# Patient Record
Sex: MALE | Race: OTHER | HISPANIC OR LATINO | URBAN - METROPOLITAN AREA
[De-identification: names, ages, dates, MRNs, and addresses within clinical notes are randomized per-mention and may not be internally consistent; named-entity substitution may affect disease eponyms.]

---

## 2021-12-13 ENCOUNTER — INPATIENT (INPATIENT)
Facility: HOSPITAL | Age: 61
LOS: 0 days | Discharge: HOME | DRG: 177 | End: 2021-12-14
Attending: INTERNAL MEDICINE | Admitting: INTERNAL MEDICINE
Payer: MEDICAID

## 2021-12-13 VITALS
DIASTOLIC BLOOD PRESSURE: 70 MMHG | WEIGHT: 176.15 LBS | RESPIRATION RATE: 20 BRPM | HEART RATE: 66 BPM | OXYGEN SATURATION: 95 % | TEMPERATURE: 98 F | HEIGHT: 70 IN | SYSTOLIC BLOOD PRESSURE: 105 MMHG

## 2021-12-13 DIAGNOSIS — Z29.9 ENCOUNTER FOR PROPHYLACTIC MEASURES, UNSPECIFIED: ICD-10-CM

## 2021-12-13 DIAGNOSIS — J96.01 ACUTE RESPIRATORY FAILURE WITH HYPOXIA: ICD-10-CM

## 2021-12-13 DIAGNOSIS — U07.1 COVID-19: ICD-10-CM

## 2021-12-13 LAB
ALBUMIN SERPL ELPH-MCNC: 2.8 G/DL — LOW (ref 3.5–5)
ALP SERPL-CCNC: 64 U/L — SIGNIFICANT CHANGE UP (ref 40–120)
ALT FLD-CCNC: 22 U/L DA — SIGNIFICANT CHANGE UP (ref 10–60)
ANION GAP SERPL CALC-SCNC: 4 MMOL/L — LOW (ref 5–17)
AST SERPL-CCNC: 26 U/L — SIGNIFICANT CHANGE UP (ref 10–40)
BASOPHILS # BLD AUTO: 0.01 K/UL — SIGNIFICANT CHANGE UP (ref 0–0.2)
BASOPHILS NFR BLD AUTO: 0.2 % — SIGNIFICANT CHANGE UP (ref 0–2)
BILIRUB SERPL-MCNC: 0.6 MG/DL — SIGNIFICANT CHANGE UP (ref 0.2–1.2)
BUN SERPL-MCNC: 18 MG/DL — SIGNIFICANT CHANGE UP (ref 7–18)
CALCIUM SERPL-MCNC: 8.3 MG/DL — LOW (ref 8.4–10.5)
CHLORIDE SERPL-SCNC: 101 MMOL/L — SIGNIFICANT CHANGE UP (ref 96–108)
CO2 SERPL-SCNC: 31 MMOL/L — SIGNIFICANT CHANGE UP (ref 22–31)
CREAT SERPL-MCNC: 1.05 MG/DL — SIGNIFICANT CHANGE UP (ref 0.5–1.3)
D DIMER BLD IA.RAPID-MCNC: 272 NG/ML DDU — HIGH
EOSINOPHIL # BLD AUTO: 0.01 K/UL — SIGNIFICANT CHANGE UP (ref 0–0.5)
EOSINOPHIL NFR BLD AUTO: 0.2 % — SIGNIFICANT CHANGE UP (ref 0–6)
GLUCOSE SERPL-MCNC: 97 MG/DL — SIGNIFICANT CHANGE UP (ref 70–99)
HCT VFR BLD CALC: 41.8 % — SIGNIFICANT CHANGE UP (ref 39–50)
HGB BLD-MCNC: 14.9 G/DL — SIGNIFICANT CHANGE UP (ref 13–17)
IMM GRANULOCYTES NFR BLD AUTO: 0.2 % — SIGNIFICANT CHANGE UP (ref 0–1.5)
LYMPHOCYTES # BLD AUTO: 1.01 K/UL — SIGNIFICANT CHANGE UP (ref 1–3.3)
LYMPHOCYTES # BLD AUTO: 21.7 % — SIGNIFICANT CHANGE UP (ref 13–44)
MCHC RBC-ENTMCNC: 33.9 PG — SIGNIFICANT CHANGE UP (ref 27–34)
MCHC RBC-ENTMCNC: 35.6 GM/DL — SIGNIFICANT CHANGE UP (ref 32–36)
MCV RBC AUTO: 95 FL — SIGNIFICANT CHANGE UP (ref 80–100)
MONOCYTES # BLD AUTO: 0.64 K/UL — SIGNIFICANT CHANGE UP (ref 0–0.9)
MONOCYTES NFR BLD AUTO: 13.8 % — SIGNIFICANT CHANGE UP (ref 2–14)
NEUTROPHILS # BLD AUTO: 2.97 K/UL — SIGNIFICANT CHANGE UP (ref 1.8–7.4)
NEUTROPHILS NFR BLD AUTO: 63.9 % — SIGNIFICANT CHANGE UP (ref 43–77)
NRBC # BLD: 0 /100 WBCS — SIGNIFICANT CHANGE UP (ref 0–0)
PLATELET # BLD AUTO: 207 K/UL — SIGNIFICANT CHANGE UP (ref 150–400)
POTASSIUM SERPL-MCNC: 3.9 MMOL/L — SIGNIFICANT CHANGE UP (ref 3.5–5.3)
POTASSIUM SERPL-SCNC: 3.9 MMOL/L — SIGNIFICANT CHANGE UP (ref 3.5–5.3)
PROT SERPL-MCNC: 6.6 G/DL — SIGNIFICANT CHANGE UP (ref 6–8.3)
RBC # BLD: 4.4 M/UL — SIGNIFICANT CHANGE UP (ref 4.2–5.8)
RBC # FLD: 11.3 % — SIGNIFICANT CHANGE UP (ref 10.3–14.5)
SARS-COV-2 RNA SPEC QL NAA+PROBE: DETECTED
SODIUM SERPL-SCNC: 136 MMOL/L — SIGNIFICANT CHANGE UP (ref 135–145)
TROPONIN I, HIGH SENSITIVITY RESULT: 10.1 NG/L — SIGNIFICANT CHANGE UP
WBC # BLD: 4.65 K/UL — SIGNIFICANT CHANGE UP (ref 3.8–10.5)
WBC # FLD AUTO: 4.65 K/UL — SIGNIFICANT CHANGE UP (ref 3.8–10.5)

## 2021-12-13 PROCEDURE — 93010 ELECTROCARDIOGRAM REPORT: CPT

## 2021-12-13 PROCEDURE — 71045 X-RAY EXAM CHEST 1 VIEW: CPT | Mod: 26

## 2021-12-13 PROCEDURE — 99285 EMERGENCY DEPT VISIT HI MDM: CPT

## 2021-12-13 RX ORDER — REMDESIVIR 5 MG/ML
200 INJECTION INTRAVENOUS EVERY 24 HOURS
Refills: 0 | Status: COMPLETED | OUTPATIENT
Start: 2021-12-13 | End: 2021-12-13

## 2021-12-13 RX ORDER — DEXAMETHASONE 0.5 MG/5ML
6 ELIXIR ORAL DAILY
Refills: 0 | Status: DISCONTINUED | OUTPATIENT
Start: 2021-12-13 | End: 2021-12-14

## 2021-12-13 RX ORDER — ENOXAPARIN SODIUM 100 MG/ML
40 INJECTION SUBCUTANEOUS DAILY
Refills: 0 | Status: DISCONTINUED | OUTPATIENT
Start: 2021-12-13 | End: 2021-12-14

## 2021-12-13 RX ORDER — SODIUM CHLORIDE 9 MG/ML
1000 INJECTION INTRAMUSCULAR; INTRAVENOUS; SUBCUTANEOUS ONCE
Refills: 0 | Status: COMPLETED | OUTPATIENT
Start: 2021-12-13 | End: 2021-12-13

## 2021-12-13 RX ORDER — REMDESIVIR 5 MG/ML
100 INJECTION INTRAVENOUS EVERY 24 HOURS
Refills: 0 | Status: DISCONTINUED | OUTPATIENT
Start: 2021-12-14 | End: 2021-12-14

## 2021-12-13 RX ORDER — REMDESIVIR 5 MG/ML
INJECTION INTRAVENOUS
Refills: 0 | Status: DISCONTINUED | OUTPATIENT
Start: 2021-12-13 | End: 2021-12-14

## 2021-12-13 RX ORDER — SODIUM CHLORIDE 9 MG/ML
1000 INJECTION INTRAMUSCULAR; INTRAVENOUS; SUBCUTANEOUS
Refills: 0 | Status: DISCONTINUED | OUTPATIENT
Start: 2021-12-13 | End: 2021-12-14

## 2021-12-13 RX ADMIN — REMDESIVIR 500 MILLIGRAM(S): 5 INJECTION INTRAVENOUS at 23:48

## 2021-12-13 RX ADMIN — SODIUM CHLORIDE 1000 MILLILITER(S): 9 INJECTION INTRAMUSCULAR; INTRAVENOUS; SUBCUTANEOUS at 14:29

## 2021-12-13 RX ADMIN — SODIUM CHLORIDE 100 MILLILITER(S): 9 INJECTION INTRAMUSCULAR; INTRAVENOUS; SUBCUTANEOUS at 23:48

## 2021-12-13 NOTE — H&P ADULT - NSHPPHYSICALEXAM_GEN_ALL_CORE
PHYSICAL EXAM:  GENERAL: NAD, well-developed  HEAD:  Atraumatic, Normocephalic  EYES: EOMI, PERRLA, conjunctiva and sclera clear  NECK: Supple, No JVD  CHEST/LUNG: Clear to auscultation bilaterally; No wheeze  HEART: Regular rate and rhythm; S1+ S2+  ABDOMEN: Soft, Nontender, Nondistended; Bowel sounds present  EXTREMITIES:, No clubbing, cyanosis, or edema  NEUROLOGY: AAOx3 non-focal  SKIN: No rashes or lesions

## 2021-12-13 NOTE — H&P ADULT - PROBLEM SELECTOR PLAN 1
Pt p/w oxygen saturation 82% on telehealth visit Pt p/w oxygen saturation 82% on telehealth visit, 90% on room air on admission  S/p NS bolus on admission  -Started on IV Decadron x 10 days, Remdesivir x 5 days  Monitor inflammatory markers  D-dimers 272, c/w Lovenox for DVT ppx

## 2021-12-13 NOTE — H&P ADULT - ASSESSMENT
60 y/o Male, lives alone, from home, with no medical or surgical hx, presenting to the ED s/p telehealth visit earlier this morning, found to be hypoxic to 82% on room air, admitted for Acute hypoxic respiratory failure 2/2 COVID

## 2021-12-13 NOTE — ED PROVIDER NOTE - CARE PLAN
Principal Discharge DX:	2019 novel coronavirus disease (COVID-19)  Secondary Diagnosis:	Syncope  Secondary Diagnosis:	Hypoxia   1

## 2021-12-13 NOTE — PATIENT PROFILE ADULT - FALL HARM RISK - UNIVERSAL INTERVENTIONS
Bed in lowest position, wheels locked, appropriate side rails in place/Call bell, personal items and telephone in reach/Instruct patient to call for assistance before getting out of bed or chair/Non-slip footwear when patient is out of bed/[place] to call system/Physically safe environment - no spills, clutter or unnecessary equipment/Purposeful Proactive Rounding/Room/bathroom lighting operational, light cord in reach

## 2021-12-13 NOTE — H&P ADULT - PROBLEM SELECTOR PLAN 2
Pt tested COVID positive on 12/5, has been experiencing Pt tested COVID positive on 12/5, has been experiencing symptoms of low appetite, cough, low grade fevers  Syncopal episode (remembers the event, he did not hit his head, briefly lost consciousness while reaching out for a honey jar as he felt very weak, has not eaten very much in over a week)  Encourage oral hydration and diet  Monitor inflammatory markers Pt tested COVID positive on 12/5, has been experiencing symptoms of low appetite, cough, low grade fevers  Oxygen supplementation PRN  Monitor inflammatory markers

## 2021-12-13 NOTE — ED ADULT NURSE NOTE - OBJECTIVE STATEMENT
Pt AOx4, ambulatory, DX COV positive 12/05, sent by PCP for low O2 sat and syncopal episode 2 days ago. Pt denies pain, SOB, CP at present time. EKG done, pt placed on cardiac monitor, no signs of distress noted.

## 2021-12-13 NOTE — H&P ADULT - HISTORY OF PRESENT ILLNESS
Patient is a 60 y/o Male, lives alone, from home, with no medical or surgical hx, presenting to the ED s/p telehealth visit  Patient is a 60 y/o Male, lives alone, from home, with no medical or surgical hx, presenting to the ED s/p telehealth visit earlier this morning, found to be hypoxic to 82% on room air. Pt tested COVID positive 12/5, has been having cough, low grade fevers since Friday. No active chest pain, shortness of breath, leg pain or swelling, abdominal pain, nausea, vomiting, diarrhea. C/o myalgia and decreased appetite. Endorsed an episode of syncope on 12/11, he has not eaten very much at all, was reaching out to open a honey jar and had an episode of LOC. No head trauma, fall.     In the ED, vitals were  Temp 97.8, HR 66/min, /70, RR 20 with oxygen saturation of 90% on room air

## 2021-12-13 NOTE — H&P ADULT - PROBLEM SELECTOR PLAN 3
Lovenox for DVT ppx Pt tested COVID positive on 12/5, has been experiencing symptoms of low appetite, cough, low grade fevers  -Syncopal episode (remembers the event, he did not hit his head, briefly lost consciousness while reaching out for a honey jar as he felt very weak, has not eaten very much in over a week)  -EKG showed Sinus bradycardia, orthostatics negative  -Trop x 1 negative  Cardio consult*** Pt tested COVID positive on 12/5, has been experiencing symptoms of low appetite, cough, low grade fevers  -Syncopal episode (remembers the event, he did not hit his head, briefly lost consciousness while reaching out for a honey jar as he felt very weak, has not eaten very much in over a week)  -EKG showed Sinus bradycardia, orthostatics negative  -Trop x 2 negative  Cardio consulted Dr Restrepo

## 2021-12-13 NOTE — H&P ADULT - ATTENDING COMMENTS
Patient is a 62 y/o Male, lives alone, from home, with no medical or surgical hx, presenting to the ED s/p telehealth visit earlier this morning, found to be hypoxic to 82% on room air. Pt tested COVID positive 12/5, has been having cough, low grade fevers since Friday. No active chest pain, shortness of breath, leg pain or swelling, abdominal pain, nausea, vomiting, diarrhea. C/o myalgia and decreased appetite. Endorsed an episode of syncope on 12/11, he has not eaten very much at all, was reaching out to open a honey jar and had an episode of LOC. No head trauma, fall.     In the ED, vitals were  Temp 97.8, HR 66/min, /70, RR 20 with oxygen saturation of 90% on room air    # ACUTE HYPOXIC RESPIRATORY FAILURE S/T COVID19 VIRAL PNA - REVIEWED CXR, PLACED ON DECADRON AND REMDESEVIR, PLACED ON SUPPLEMENTAL O2, MONITOR INFLAMMATORY MARKERS  - CONTACT AND DROPLET PRECAUTIONS    # SYNCOPE TWO DAYS AGO - PATIENT REPORTS POOR PO INTAKE AND PRO-DROME OF LIGHTHEADEDNESS - ? ORTHOSTATIC HYPOTENSION - F/U ORTHOSTATIC VITALS, EKG, TELEMETRY, CARDIOLOGY CONSULT    # GI AND DVT PPX

## 2021-12-13 NOTE — ED PROVIDER NOTE - OBJECTIVE STATEMENT
Patient reports he tested positive for COVID on 12/5. Felt tired. On 12/11, walked to the kitchen and passed out. Today, was having a telehealth visit, pulse ox was 82%RA. Patient was not aware of being short of breath. No fever, cp, ap, n/v/d.

## 2021-12-14 ENCOUNTER — TRANSCRIPTION ENCOUNTER (OUTPATIENT)
Age: 61
End: 2021-12-14

## 2021-12-14 VITALS
OXYGEN SATURATION: 98 % | DIASTOLIC BLOOD PRESSURE: 73 MMHG | SYSTOLIC BLOOD PRESSURE: 106 MMHG | TEMPERATURE: 98 F | RESPIRATION RATE: 17 BRPM | HEART RATE: 59 BPM

## 2021-12-14 DIAGNOSIS — R55 SYNCOPE AND COLLAPSE: ICD-10-CM

## 2021-12-14 LAB
ALBUMIN SERPL ELPH-MCNC: 2.3 G/DL — LOW (ref 3.5–5)
ALBUMIN SERPL ELPH-MCNC: 2.4 G/DL — LOW (ref 3.5–5)
ALP SERPL-CCNC: 66 U/L — SIGNIFICANT CHANGE UP (ref 40–120)
ALP SERPL-CCNC: 67 U/L — SIGNIFICANT CHANGE UP (ref 40–120)
ALT FLD-CCNC: 26 U/L DA — SIGNIFICANT CHANGE UP (ref 10–60)
ALT FLD-CCNC: 28 U/L DA — SIGNIFICANT CHANGE UP (ref 10–60)
ANION GAP SERPL CALC-SCNC: 6 MMOL/L — SIGNIFICANT CHANGE UP (ref 5–17)
AST SERPL-CCNC: 26 U/L — SIGNIFICANT CHANGE UP (ref 10–40)
AST SERPL-CCNC: 28 U/L — SIGNIFICANT CHANGE UP (ref 10–40)
BASOPHILS # BLD AUTO: 0.01 K/UL — SIGNIFICANT CHANGE UP (ref 0–0.2)
BASOPHILS NFR BLD AUTO: 0.2 % — SIGNIFICANT CHANGE UP (ref 0–2)
BILIRUB DIRECT SERPL-MCNC: 0.2 MG/DL — SIGNIFICANT CHANGE UP (ref 0–0.3)
BILIRUB INDIRECT FLD-MCNC: 0.2 MG/DL — SIGNIFICANT CHANGE UP (ref 0.2–1)
BILIRUB SERPL-MCNC: 0.4 MG/DL — SIGNIFICANT CHANGE UP (ref 0.2–1.2)
BILIRUB SERPL-MCNC: 0.4 MG/DL — SIGNIFICANT CHANGE UP (ref 0.2–1.2)
BUN SERPL-MCNC: 13 MG/DL — SIGNIFICANT CHANGE UP (ref 7–18)
CALCIUM SERPL-MCNC: 7.9 MG/DL — LOW (ref 8.4–10.5)
CHLORIDE SERPL-SCNC: 106 MMOL/L — SIGNIFICANT CHANGE UP (ref 96–108)
CO2 SERPL-SCNC: 27 MMOL/L — SIGNIFICANT CHANGE UP (ref 22–31)
CREAT SERPL-MCNC: 0.81 MG/DL — SIGNIFICANT CHANGE UP (ref 0.5–1.3)
CRP SERPL-MCNC: 11 MG/L — HIGH
CRP SERPL-MCNC: 9 MG/L — HIGH
D DIMER BLD IA.RAPID-MCNC: 329 NG/ML DDU — HIGH
EOSINOPHIL # BLD AUTO: 0.01 K/UL — SIGNIFICANT CHANGE UP (ref 0–0.5)
EOSINOPHIL NFR BLD AUTO: 0.2 % — SIGNIFICANT CHANGE UP (ref 0–6)
FERRITIN SERPL-MCNC: 637 NG/ML — HIGH (ref 30–400)
FERRITIN SERPL-MCNC: 720 NG/ML — HIGH (ref 30–400)
GLUCOSE SERPL-MCNC: 100 MG/DL — HIGH (ref 70–99)
HCT VFR BLD CALC: 39.2 % — SIGNIFICANT CHANGE UP (ref 39–50)
HCV AB S/CO SERPL IA: 0.41 S/CO — SIGNIFICANT CHANGE UP (ref 0–0.99)
HCV AB SERPL-IMP: SIGNIFICANT CHANGE UP
HGB BLD-MCNC: 13.9 G/DL — SIGNIFICANT CHANGE UP (ref 13–17)
IMM GRANULOCYTES NFR BLD AUTO: 0.5 % — SIGNIFICANT CHANGE UP (ref 0–1.5)
LYMPHOCYTES # BLD AUTO: 0.8 K/UL — LOW (ref 1–3.3)
LYMPHOCYTES # BLD AUTO: 18.5 % — SIGNIFICANT CHANGE UP (ref 13–44)
MAGNESIUM SERPL-MCNC: 2.2 MG/DL — SIGNIFICANT CHANGE UP (ref 1.6–2.6)
MCHC RBC-ENTMCNC: 33.7 PG — SIGNIFICANT CHANGE UP (ref 27–34)
MCHC RBC-ENTMCNC: 35.5 GM/DL — SIGNIFICANT CHANGE UP (ref 32–36)
MCV RBC AUTO: 94.9 FL — SIGNIFICANT CHANGE UP (ref 80–100)
MONOCYTES # BLD AUTO: 0.33 K/UL — SIGNIFICANT CHANGE UP (ref 0–0.9)
MONOCYTES NFR BLD AUTO: 7.6 % — SIGNIFICANT CHANGE UP (ref 2–14)
NEUTROPHILS # BLD AUTO: 3.15 K/UL — SIGNIFICANT CHANGE UP (ref 1.8–7.4)
NEUTROPHILS NFR BLD AUTO: 73 % — SIGNIFICANT CHANGE UP (ref 43–77)
NRBC # BLD: 0 /100 WBCS — SIGNIFICANT CHANGE UP (ref 0–0)
NT-PROBNP SERPL-SCNC: 104 PG/ML — SIGNIFICANT CHANGE UP (ref 0–125)
PHOSPHATE SERPL-MCNC: 3.2 MG/DL — SIGNIFICANT CHANGE UP (ref 2.5–4.5)
PLATELET # BLD AUTO: 230 K/UL — SIGNIFICANT CHANGE UP (ref 150–400)
POTASSIUM SERPL-MCNC: 3.8 MMOL/L — SIGNIFICANT CHANGE UP (ref 3.5–5.3)
POTASSIUM SERPL-SCNC: 3.8 MMOL/L — SIGNIFICANT CHANGE UP (ref 3.5–5.3)
PROCALCITONIN SERPL-MCNC: 0.06 NG/ML — SIGNIFICANT CHANGE UP (ref 0.02–0.1)
PROT SERPL-MCNC: 5.9 G/DL — LOW (ref 6–8.3)
PROT SERPL-MCNC: 5.9 G/DL — LOW (ref 6–8.3)
RBC # BLD: 4.13 M/UL — LOW (ref 4.2–5.8)
RBC # FLD: 11.6 % — SIGNIFICANT CHANGE UP (ref 10.3–14.5)
SODIUM SERPL-SCNC: 139 MMOL/L — SIGNIFICANT CHANGE UP (ref 135–145)
TROPONIN I, HIGH SENSITIVITY RESULT: 12.1 NG/L — SIGNIFICANT CHANGE UP
WBC # BLD: 4.32 K/UL — SIGNIFICANT CHANGE UP (ref 3.8–10.5)
WBC # FLD AUTO: 4.32 K/UL — SIGNIFICANT CHANGE UP (ref 3.8–10.5)

## 2021-12-14 PROCEDURE — 93005 ELECTROCARDIOGRAM TRACING: CPT

## 2021-12-14 PROCEDURE — 80053 COMPREHEN METABOLIC PANEL: CPT

## 2021-12-14 PROCEDURE — 84484 ASSAY OF TROPONIN QUANT: CPT

## 2021-12-14 PROCEDURE — 36415 COLL VENOUS BLD VENIPUNCTURE: CPT

## 2021-12-14 PROCEDURE — 71275 CT ANGIOGRAPHY CHEST: CPT

## 2021-12-14 PROCEDURE — 82728 ASSAY OF FERRITIN: CPT

## 2021-12-14 PROCEDURE — 71275 CT ANGIOGRAPHY CHEST: CPT | Mod: 26

## 2021-12-14 PROCEDURE — 99285 EMERGENCY DEPT VISIT HI MDM: CPT | Mod: 25

## 2021-12-14 PROCEDURE — 84145 PROCALCITONIN (PCT): CPT

## 2021-12-14 PROCEDURE — 83735 ASSAY OF MAGNESIUM: CPT

## 2021-12-14 PROCEDURE — 83880 ASSAY OF NATRIURETIC PEPTIDE: CPT

## 2021-12-14 PROCEDURE — 85379 FIBRIN DEGRADATION QUANT: CPT

## 2021-12-14 PROCEDURE — 71045 X-RAY EXAM CHEST 1 VIEW: CPT

## 2021-12-14 PROCEDURE — 85025 COMPLETE CBC W/AUTO DIFF WBC: CPT

## 2021-12-14 PROCEDURE — 80076 HEPATIC FUNCTION PANEL: CPT

## 2021-12-14 PROCEDURE — 86803 HEPATITIS C AB TEST: CPT

## 2021-12-14 PROCEDURE — 86140 C-REACTIVE PROTEIN: CPT

## 2021-12-14 PROCEDURE — 84100 ASSAY OF PHOSPHORUS: CPT

## 2021-12-14 PROCEDURE — 87635 SARS-COV-2 COVID-19 AMP PRB: CPT

## 2021-12-14 RX ORDER — DEXAMETHASONE 0.5 MG/5ML
1 ELIXIR ORAL
Qty: 42 | Refills: 0
Start: 2021-12-14 | End: 2021-12-25

## 2021-12-14 RX ORDER — ASPIRIN/CALCIUM CARB/MAGNESIUM 324 MG
1 TABLET ORAL
Qty: 30 | Refills: 0
Start: 2021-12-14 | End: 2022-01-12

## 2021-12-14 RX ORDER — INFLUENZA VIRUS VACCINE 15; 15; 15; 15 UG/.5ML; UG/.5ML; UG/.5ML; UG/.5ML
0.5 SUSPENSION INTRAMUSCULAR ONCE
Refills: 0 | Status: COMPLETED | OUTPATIENT
Start: 2021-12-14 | End: 2021-12-14

## 2021-12-14 RX ADMIN — Medication 6 MILLIGRAM(S): at 06:15

## 2021-12-14 RX ADMIN — ENOXAPARIN SODIUM 40 MILLIGRAM(S): 100 INJECTION SUBCUTANEOUS at 12:57

## 2021-12-14 NOTE — PROGRESS NOTE ADULT - PROBLEM SELECTOR PLAN 2
Pt tested COVID positive on 12/5, has been experiencing symptoms of low appetite, cough, low grade fevers  Oxygen supplementation PRN  Monitor inflammatory markers

## 2021-12-14 NOTE — DISCHARGE NOTE NURSING/CASE MANAGEMENT/SOCIAL WORK - PATIENT PORTAL LINK FT
You can access the FollowMyHealth Patient Portal offered by [facility] by registering at the following website: [email or web address]. By joining [facility]’s FollowMyHealth portal, you will also be able to view your health information using other applications (apps) compatible with our system.

## 2021-12-14 NOTE — DISCHARGE NOTE PROVIDER - HOSPITAL COURSE
Patient is a 60 y/o Male, lives alone, from home, with no medical or surgical hx, presenting to the ED s/p telehealth visit earlier this morning, found to be hypoxic to 82% on room air. Pt tested COVID positive 12/5, has been having cough, low grade fevers since Friday. No active chest pain, shortness of breath, leg pain or swelling, abdominal pain, nausea, vomiting, diarrhea. C/o myalgia and decreased appetite. Endorsed an episode of syncope on 12/11, he has not eaten very much at all, was reaching out to open a honey jar and had an episode of LOC. No head trauma, fall.     Patient admitted for acute hypoxic respiratory failure and syncope. CXR showed no evidence of lung disease. Patient found to be saturation 90% on RA in the ED. Patient started on remdesivir and dexamethasone. Patient slowly weened off oxygen. At time of discharged patient saturating ~96% on RA. Because of the mismatch between CXR findings and initial O2 requirements, patient had CTA which was negative for a PE. Found to be slightly orthostatic positive. Likely secondary to poor po intake since COVID diagnosis. Patient given IVF and showed subsequent improvement. Patient will be discharged on steroid taper, and 30 day supply of aspirin. Patient advised to follow-up with his PCP.    Given patient's improved clinical status and current hemodynamic stability, decision was made to discharge. Please refer to patient's complete medical chart with documents for a full hospital course, for this is only a brief summary.   Patient is a 60 y/o Male, lives alone, from home, with no medical or surgical hx, presenting to the ED s/p telehealth visit earlier this morning, found to be hypoxic to 82% on room air. Pt tested COVID positive 12/5, has been having cough, low grade fevers since Friday. No active chest pain, shortness of breath, leg pain or swelling, abdominal pain, nausea, vomiting, diarrhea. C/o myalgia and decreased appetite. Endorsed an episode of syncope on 12/11, he has not eaten very much at all, was reaching out to open a honey jar and had an episode of LOC. No head trauma, fall.     Patient admitted for acute hypoxic respiratory failure and syncope. CXR showed no evidence of lung disease. Patient found to be saturation 90% on RA in the ED. Patient started on remdesivir and dexamethasone. Patient slowly weened off oxygen. At time of discharged patient saturating ~96% on RA. Because of the mismatch between CXR findings and initial O2 requirements, patient had CTA which showed COVID pneumonia, no evidence of PE. Found to be slightly orthostatic positive. Likely secondary to poor po intake since COVID diagnosis. Patient given IVF and showed subsequent improvement. Patient will be discharged on steroid taper, and 30 day supply of aspirin. Patient advised to follow-up with his PCP.    Given patient's improved clinical status and current hemodynamic stability, decision was made to discharge. Please refer to patient's complete medical chart with documents for a full hospital course, for this is only a brief summary.

## 2021-12-14 NOTE — PROGRESS NOTE ADULT - PROBLEM SELECTOR PLAN 1
Pt p/w oxygen saturation 82% on telehealth visit, 90% on room air on admission  S/p NS bolus on admission  -Started on IV Decadron x 10 days, Remdesivir x 5 days  Monitor inflammatory markers  D-dimers 272 > 329, c/w Lovenox for DVT ppx  f/u CTA

## 2021-12-14 NOTE — DISCHARGE NOTE PROVIDER - NSDCMRMEDTOKEN_GEN_ALL_CORE_FT
aspirin 81 mg oral tablet, chewable: 1 tab(s) chewed once a day   dexamethasone 1 mg oral tablet: 6 tab(s) orally once a day x 4 days  4 tab(s) orally once a day x 2 days  3 tab(s) orally once a day x 2 days  2 tab(s) orally once a day x 2 days  1 tab(s) orally once a day x 2 days   aspirin 81 mg oral tablet, chewable: 1 tab(s) chewed once a day   dexamethasone 1 mg oral tablet: 6 tab(s) orally once a day x 3 days  4 tab(s) orally once a day x 2 days  3 tab(s) orally once a day x 2 days  2 tab(s) orally once a day x 2 days  1 tab(s) orally once a day x 2 days

## 2021-12-14 NOTE — DISCHARGE NOTE PROVIDER - NSDCCPCAREPLAN_GEN_ALL_CORE_FT
PRINCIPAL DISCHARGE DIAGNOSIS  Diagnosis: 2019 novel coronavirus disease (COVID-19)  Assessment and Plan of Treatment: You presented with fatigue and weakness. You were found to have COVID. Your CXR however was clear. You initially presented with low oxygen levels and were given oxygen support. However, you were quickly weened off of oxygen. At time of discharge you were saturating well on room air. You will be discharged on a steroid taper as well as a 30 day course of aspirin. Please take as prescribed. Please follow-up with your PCP within 1-2 weeks of discharge. Should you experience significant shortness of breath before then, please seek medical attention sooner. If you do not have a primary care physician, please Dr. Ivey in her office. See her contact information in the follow-up section.      SECONDARY DISCHARGE DIAGNOSES  Diagnosis: Syncope  Assessment and Plan of Treatment: You presented with syncope a few days before your admission. You were found here to be orthostatic positive , which means you have temporary decrease in blood flow to your brain, likely since you were not eating or drinking much for many days. Please be sure to drink plenty of fluids at home. Please stand slowly to give your body time to adjust. If you feel like you are going to pass again you can try squatting or clenching your fists, both which can increase the blood flow to your brain. You can also try laying down on the ground.     PRINCIPAL DISCHARGE DIAGNOSIS  Diagnosis: 2019 novel coronavirus disease (COVID-19)  Assessment and Plan of Treatment: You presented with fatigue and weakness. You were found to have COVID. Your CXR however was clear. You initially presented with low oxygen levels and were given oxygen support. However, you were quickly weened off of oxygen. At time of discharge you were saturating well on room air. You will be discharged on a steroid taper as well as a 30 day course of aspirin. Please take as prescribed. Please follow-up with your PCP within 1-2 weeks of discharge. Should you experience significant shortness of breath before then, please seek medical attention sooner. If you do not have a primary care physician, please see Dr. Ivey in her office. See her contact information in the follow-up section.      SECONDARY DISCHARGE DIAGNOSES  Diagnosis: Syncope  Assessment and Plan of Treatment: You presented with syncope a few days before your admission. You were found here to be orthostatic positive , which means you have temporary decrease in blood flow to your brain, likely since you were not eating or drinking much for many days. Please be sure to drink plenty of fluids at home. Please stand slowly to give your body time to adjust. If you feel like you are going to pass again you can try squatting or clenching your fists, both which can increase the blood flow to your brain. You can also try laying down on the ground.     PRINCIPAL DISCHARGE DIAGNOSIS  Diagnosis: 2019 novel coronavirus disease (COVID-19)  Assessment and Plan of Treatment: You presented with fatigue and weakness. You were found to have COVID. Your CXR however was clear. You initially presented with low oxygen levels and were given oxygen support. However, you were quickly weened off of oxygen. At time of discharge you were saturating well on room air. You will be discharged on a steroid taper as well as a 30 day course of aspirin. Please take as prescribed. Please follow-up with your PCP within 1-2 weeks of discharge. Should you experience significant shortness of breath before then, please seek medical attention sooner. CTA was done which showed COVID pneumonia, no evidence of pulmonary embolism. If you do not have a primary care physician, please see Dr. Ivey in her office. See her contact information in the follow-up section.        SECONDARY DISCHARGE DIAGNOSES  Diagnosis: Syncope  Assessment and Plan of Treatment: You presented with syncope a few days before your admission. You were found here to be orthostatic positive , which means you have temporary decrease in blood flow to your brain, likely since you were not eating or drinking much for many days. Please be sure to drink plenty of fluids at home. Please stand slowly to give your body time to adjust. If you feel like you are going to pass again you can try squatting or clenching your fists, both which can increase the blood flow to your brain. You can also try laying down on the ground.     PRINCIPAL DISCHARGE DIAGNOSIS  Diagnosis: 2019 novel coronavirus disease (COVID-19)  Assessment and Plan of Treatment: You presented with fatigue and weakness. You were found to have COVID. Your CXR however was clear. You initially presented with low oxygen levels and were given oxygen support. However, you were quickly weened off of oxygen. At time of discharge you were saturating well on room air. You will be discharged on a steroid taper as well as a 30 day course of aspirin. Please take as prescribed. Please follow-up with your PCP within 1-2 weeks of discharge. Should you experience significant shortness of breath before then, please seek medical attention sooner. CTA was done which showed COVID pneumonia, no evidence of pulmonary embolism. You do not have a PCP. Please follow-up with our clinic in [place] within 1-2 weeks. If you cannot get an appointment with them, please follow-up with Dr. Ivey. Please see the follow-up section for contact information.        SECONDARY DISCHARGE DIAGNOSES  Diagnosis: Syncope  Assessment and Plan of Treatment: You presented with syncope a few days before your admission. You were found here to be orthostatic positive , which means you have temporary decrease in blood flow to your brain, likely since you were not eating or drinking much for many days. Please be sure to drink plenty of fluids at home. Please stand slowly to give your body time to adjust. If you feel like you are going to pass again you can try squatting or clenching your fists, both which can increase the blood flow to your brain. You can also try laying down on the ground.

## 2021-12-14 NOTE — DISCHARGE NOTE PROVIDER - CARE PROVIDER_API CALL
Andria Ivey)  Internal Medicine  [address] [place] [address]  Phone: [phone]  Fax: [phone]  Follow Up Time:

## 2021-12-14 NOTE — DISCHARGE NOTE PROVIDER - NSFOLLOWUPCLINICS_GEN_ALL_ED_FT
[facility]  Internal Medicine  [address] [place] [address]  Phone: [phone]  Fax: [phone]

## 2021-12-14 NOTE — PROGRESS NOTE ADULT - SUBJECTIVE AND OBJECTIVE BOX
PGY-1 Progress Note discussed with attending    PAGER #: [phone]] TILL 5:00 PM  PLEASE CONTACT ON CALL TEAM:  - On Call Team (Please refer to Ольга) FROM 5:00 PM - 8:30PM  - Nightfloat Team FROM 8:30 -7:30 AM    HPI:  Patient is a 62 y/o Male, lives alone, from home, with no medical or surgical hx, presenting to the ED s/p telehealth visit earlier this morning, found to be hypoxic to 82% on room air. Pt tested COVID positive 12/5, has been having cough, low grade fevers since Friday. No active chest pain, shortness of breath, leg pain or swelling, abdominal pain, nausea, vomiting, diarrhea. C/o myalgia and decreased appetite. Endorsed an episode of syncope on 12/11, he has not eaten very much at all, was reaching out to open a honey jar and had an episode of LOC. No head trauma, fall.     In the ED, vitals were  Temp 97.8, HR 66/min, /70, RR 20 with oxygen saturation of 90% on room air (13 Dec 2021 20:39)      OVERNIGHT EVENTS:   -     REVIEW OF SYSTEMS:  CONSTITUTIONAL: No fever, weight loss, or fatigue  RESPIRATORY: No cough, wheezing, chills or hemoptysis; No shortness of breath  CARDIOVASCULAR: No chest pain, palpitations, dizziness, or leg swelling  GASTROINTESTINAL: No abdominal pain. No nausea, vomiting, or hematemesis; No diarrhea or constipation. No melena or hematochezia.  GENITOURINARY: No dysuria or hematuria, urinary frequency  NEUROLOGICAL: No headaches, memory loss, loss of strength, numbness, or tremors  SKIN: No itching, burning, rashes, or lesions     MEDICATIONS  (STANDING):  dexAMETHasone  Injectable 6 milliGRAM(s) IV Push daily  enoxaparin Injectable 40 milliGRAM(s) SubCutaneous daily  remdesivir  IVPB   IV Intermittent   remdesivir  IVPB 100 milliGRAM(s) IV Intermittent every 24 hours  sodium chloride 0.9%. 1000 milliLiter(s) (100 mL/Hr) IV Continuous <Continuous>    MEDICATIONS  (PRN):      Vital Signs Last 24 Hrs  T(C): 36.5 (14 Dec 2021 11:47), Max: 37.3 (14 Dec 2021 00:18)  T(F): 97.7 (14 Dec 2021 11:47), Max: 99.2 (14 Dec 2021 00:18)  HR: 59 (14 Dec 2021 11:47) (51 - 64)  BP: 124/72 (14 Dec 2021 11:47) (108/58 - 124/72)  BP(mean): --  RR: 17 (14 Dec 2021 11:47) (17 - 20)  SpO2: 93% (14 Dec 2021 11:47) (92% - 100%)    PHYSICAL EXAMINATION:  GENERAL: NAD, AAOx3  HEAD: AT/NC  EYES: conjunctiva and sclera clear  NECK: supple, No JVD noted, Normal thyroid  CHEST/LUNG: CTABL; no rales, rhonchi, wheezing, or rubs  HEART: regular rate and rhythm; no murmurs, rubs, or gallops  ABDOMEN: soft, nontender, nondistended; Bowel sounds present  EXTREMITIES:  2+ Peripheral Pulses, No clubbing, cyanosis, or edema  SKIN: warm dry                          13.9   4.32  )-----------( 230      ( 14 Dec 2021 08:41 )             39.2     12-14    139  |  106  |  13  ----------------------------<  100<H>  3.8   |  27  |  0.81    Ca    7.9<L>      14 Dec 2021 08:41  Phos  3.2     12-14  Mg     2.2     12-14    TPro  5.9<L>  /  Alb  2.3<L>  /  TBili  0.4  /  DBili  0.2  /  AST  28  /  ALT  28  /  AlkPhos  67  12-14    LIVER FUNCTIONS - ( 14 Dec 2021 08:41 )  Alb: 2.3 g/dL / Pro: 5.9 g/dL / ALK PHOS: 67 U/L / ALT: 28 U/L DA / AST: 28 U/L / GGT: x                 COVID-19 PCR: Detected (13 Dec 2021 14:33)      CAPILLARY BLOOD GLUCOSE          RADIOLOGY & ADDITIONAL TESTS:

## 2021-12-14 NOTE — PROGRESS NOTE ADULT - PROBLEM SELECTOR PLAN 3
Pt tested COVID positive on 12/5, has been experiencing symptoms of low appetite, cough, low grade fevers  -Syncopal episode (remembers the event, he did not hit his head, briefly lost consciousness while reaching out for a honey jar as he felt very weak, has not eaten very much in over a week)  -EKG showed Sinus bradycardia, orthostatics negative  -Trop x 2 negative  Cardio consulted Dr Restrepo

## 2021-12-14 NOTE — DISCHARGE NOTE NURSING/CASE MANAGEMENT/SOCIAL WORK - NSDCPEFALRISK_GEN_ALL_CORE
For information on Fall & Injury Prevention, visit: [email or web address] OR  [email or web address] OR  https://www.cdc.gov/steadi/patient.html

## 2021-12-14 NOTE — CONSULT NOTE ADULT - SUBJECTIVE AND OBJECTIVE BOX
C A R D I O L O G Y  *********************    DATE OF SERVICE: 12-14-21    HISTORY OF PRESENT ILLNESS: HPI:  Patient is a 60 y/o Male, lives alone, from home, with no medical or surgical hx, presenting to the ED s/p telehealth visit earlier this morning, found to be hypoxic to 82% on room air. Pt tested COVID positive 12/5, has been having cough, low grade fevers since Friday. No active chest pain, shortness of breath, leg pain or swelling, abdominal pain, nausea, vomiting, diarrhea. C/o myalgia and decreased appetite. Endorsed an episode of syncope on 12/11, he has not eaten very much at all, was reaching out to open a honey jar and had an episode of LOC. No head trauma, fall.     In the ED, vitals were  Temp 97.8, HR 66/min, /70, RR 20 with oxygen saturation of 90% on room air (13 Dec 2021 20:39)      PAST MEDICAL & SURGICAL HISTORY:  No pertinent past medical history            MEDICATIONS:  MEDICATIONS  (STANDING):  dexAMETHasone  Injectable 6 milliGRAM(s) IV Push daily  enoxaparin Injectable 40 milliGRAM(s) SubCutaneous daily  influenza   Vaccine 0.5 milliLiter(s) IntraMuscular once  remdesivir  IVPB   IV Intermittent   remdesivir  IVPB 100 milliGRAM(s) IV Intermittent every 24 hours  sodium chloride 0.9%. 1000 milliLiter(s) (100 mL/Hr) IV Continuous <Continuous>      Allergies    No Known Allergies    Intolerances        FAMILY HISTORY:    Non-contributary for premature coronary disease or sudden cardiac death    SOCIAL HISTORY:    [ ] Non-smoker  [ ] Smoker  [ ] Alcohol    FLU VACCINE THIS YEAR STARTS IN AUGUST:  [ ] Yes    [ ] No    IF OVER 65 HAVE YOU EVER HAD A PNA VACCINE:  [ ] Yes    [ ] No       [ ] N/A      REVIEW OF SYSTEMS:  [ ]chest pain  [  ]shortness of breath  [  ]palpitations  [  ]syncope  [ ]near syncope [ ]upper extremity weakness   [ ] lower extremity weakness  [  ]diplopia  [  ]altered mental status   [  ]fevers  [ ]chills [ ]nausea  [ ]vomitting  [  ]dysphagia    [ ]abdominal pain  [ ]melena  [ ]BRBPR    [  ]epistaxis  [  ]rash    [ ]lower extremity edema        [X] All others negative	  [ ] Unable to obtain      LABS:	 	    CARDIAC MARKERS:        Troponin I, High Sensitivity Result: 12.1 ng/L (12-14-21 @ 01:03)  Troponin I, High Sensitivity Result: 10.1 ng/L (12-13-21 @ 14:33)                            13.9   4.32  )-----------( 230      ( 14 Dec 2021 08:41 )             39.2     Hb Trend: 13.9<--, 14.9<--    12-14    139  |  106  |  13  ----------------------------<  100<H>  3.8   |  27  |  0.81    Ca    7.9<L>      14 Dec 2021 08:41  Phos  3.2     12-14  Mg     2.2     12-14    TPro  5.9<L>  /  Alb  2.3<L>  /  TBili  0.4  /  DBili  0.2  /  AST  28  /  ALT  28  /  AlkPhos  67  12-14    Creatinine Trend: 0.81<--, 1.05<--    Coags:      proBNP:   Lipid Profile:   HgA1c:   TSH:         PHYSICAL EXAM:  T(C): 36.9 (12-14-21 @ 08:50), Max: 37.3 (12-14-21 @ 00:18)  HR: 52 (12-14-21 @ 08:50) (51 - 66)  BP: 117/68 (12-14-21 @ 08:50) (105/70 - 124/62)  RR: 18 (12-14-21 @ 08:50) (18 - 20)  SpO2: 92% (12-14-21 @ 08:50) (90% - 100%)  Wt(kg): --   BMI (kg/m2): 25.3 (12-13-21 @ 12:40)  I&O's Summary      Gen: Appears well in NAD  HEENT:  (-)icterus (-)pallor  CV: N S1 S2 1/6 AN (+)2 Pulses B/l  Resp:  Clear to ausculatation B/L, normal effort  GI: (+) BS Soft, NT, ND  Lymph:  (-)Edema, (-)obvious lymphadenopathy  Skin: Warm to touch, Normal turgor  Psych: Appropriate mood and affect      ECG:  	Sinus Román 52 BPM    RADIOLOGY:         CXR:  PENDING    ASSESSMENT/PLAN: 	61y Male admitted with Hypoxia, LOC and (+) covid.    - Check CTPA as hypoxia is out of proportion to CXR finfings.  By my read, no significant airspace disease  - Check BNP  - Treatment of COVID per medical team    I once again thank you for allowing me to participate in the care of your patient.  If you have any questions or concerns please do not hesitate to contact me.    Walter Restrepo MD, [facility]  BEEPER [phone]

## 2021-12-16 ENCOUNTER — TRANSCRIPTION ENCOUNTER (OUTPATIENT)
Age: 61
End: 2021-12-16

## 2022-03-03 ENCOUNTER — APPOINTMENT (OUTPATIENT)
Dept: INTERNAL MEDICINE | Facility: CLINIC | Age: 62
End: 2022-03-03

## 2022-04-22 ENCOUNTER — NON-APPOINTMENT (OUTPATIENT)
Age: 62
End: 2022-04-22

## 2022-04-22 ENCOUNTER — APPOINTMENT (OUTPATIENT)
Dept: INTERNAL MEDICINE | Facility: CLINIC | Age: 62
End: 2022-04-22
Payer: MEDICAID

## 2022-04-22 VITALS
BODY MASS INDEX: 25.91 KG/M2 | DIASTOLIC BLOOD PRESSURE: 74 MMHG | RESPIRATION RATE: 16 BRPM | OXYGEN SATURATION: 94 % | HEIGHT: 70 IN | WEIGHT: 181 LBS | HEART RATE: 67 BPM | SYSTOLIC BLOOD PRESSURE: 118 MMHG | TEMPERATURE: 98.5 F

## 2022-04-22 DIAGNOSIS — Z78.9 OTHER SPECIFIED HEALTH STATUS: ICD-10-CM

## 2022-04-22 DIAGNOSIS — R93.89 ABNORMAL FINDINGS ON DIAGNOSTIC IMAGING OF OTHER SPECIFIED BODY STRUCTURES: ICD-10-CM

## 2022-04-22 DIAGNOSIS — Z00.00 ENCOUNTER FOR GENERAL ADULT MEDICAL EXAMINATION W/OUT ABNORMAL FINDINGS: ICD-10-CM

## 2022-04-22 DIAGNOSIS — Z12.11 ENCOUNTER FOR SCREENING FOR MALIGNANT NEOPLASM OF COLON: ICD-10-CM

## 2022-04-22 PROCEDURE — 99386 PREV VISIT NEW AGE 40-64: CPT | Mod: 25

## 2022-04-22 PROCEDURE — 93000 ELECTROCARDIOGRAM COMPLETE: CPT

## 2022-04-22 PROCEDURE — 99203 OFFICE O/P NEW LOW 30 MIN: CPT | Mod: 25

## 2022-04-22 RX ORDER — DICLOFENAC SODIUM 1% 10 MG/G
1 GEL TOPICAL
Qty: 2 | Refills: 1 | Status: ACTIVE | COMMUNITY
Start: 2022-04-22 | End: 1900-01-01

## 2022-04-22 NOTE — HEALTH RISK ASSESSMENT
[Never] : Never [Yes] : Yes [Monthly or less (1 pt)] : Monthly or less (1 point) [1 or 2 (0 pts)] : 1 or 2 (0 points) [Never (0 pts)] : Never (0 points) [No] : In the past 12 months have you used drugs other than those required for medical reasons? No [0] : 2) Feeling down, depressed, or hopeless: Not at all (0) [HIV test declined] : HIV test declined [Hepatitis C test declined] : Hepatitis C test declined [Employed] : employed [Fully functional (bathing, dressing, toileting, transferring, walking, feeding)] : Fully functional (bathing, dressing, toileting, transferring, walking, feeding) [Fully functional (using the telephone, shopping, preparing meals, housekeeping, doing laundry, using] : Fully functional and needs no help or supervision to perform IADLs (using the telephone, shopping, preparing meals, housekeeping, doing laundry, using transportation, managing medications and managing finances) [Patient reported colonoscopy was normal] : Patient reported colonoscopy was normal [Sexually Active] : sexually active [Feels Safe at Home] : Feels safe at home [Audit-CScore] : 1 [[id number]] : 0 [Change in mental status noted] : No change in mental status noted [Language] : denies difficulty with language [Behavior] : denies difficulty with behavior [Learning/Retaining New Information] : denies difficulty learning/retaining new information [Handling Complex Tasks] : denies difficulty handling complex tasks [Reasoning] : denies difficulty with reasoning [Spatial Ability and Orientation] : denies difficulty with spatial ability and orientation [High Risk Behavior] : no high risk behavior [ColonoscopyDate] : 01/2007 [FreeTextEntry2] : SALES

## 2022-04-22 NOTE — HISTORY OF PRESENT ILLNESS
[de-identified] : 61 Y.O SPANISN m W/PMHx of covid19 infection here to establish care and annual \par \par was in HOSPITAL DUE TO COVID19 Pneumonia IN 2022 \par now recoverd;\par \par \par reported that chronic Lt shoulder pain; had injury in [facility] before;\par intermittent pain; decrease ROM;no fever or chills or swelling\par \par \par ROS\par \par Constitutional:  no fever and no chills. \par Eyes:  no discharge and no pain. \par HEENT:  no earache and no hearing loss. \par Cardiovascular:  no chest pain, no palpitations and no leg claudication. \par Respiratory:  no shortness of breath, no wheezing and no cough. \par Gastrointestinal:  no abdominal pain, no nausea and no constipation. \par Genitourinary:  no dysuria and no incontinence. \par Musculoskeletal:  no joint pain, no joint stiffness and no joint swelling. as per HPI\par Integumentary:  no itching and no mole changes. \par Neurological:  no headache, no dizziness and no fainting. \par Psychiatric:  not suicidal, no insomnia, no anxiety and no depression. \par \par Physical Exam\par \par Constitutional:   no acute distress, well nourished, well developed and well-appearing. \par Eyes:  normal sclera/conjunctiva, pupils equal round and reactive to light and extraocular movements intact. \par ENT:  the outer ears and nose were normal in appearance and the oropharynx was normal. \par Neck:  supple, no lymphadenopathy and the thyroid was normal and there were no nodules present. \par Pulmonary:  no respiratory distress, lungs were clear to auscultation bilaterally, no accessory muscle use. \par Cardiac:  normal rate, with a regular rhythm, normal S1 and S2 and no murmur heard. \par Vascular:  there was no peripheral edema. \par Abdomen:  abdomen soft, non-tender, non-distended, no abdominal mass palpated, no HSM and normal bowel sounds. \par Lymphatic:  no posterior cervical lymphadenopathy, no anterior cervical lymphadenopathy. \par Back:  no CVA tenderness and no spinal tenderness. \par Musculoskeletal: no joint swelling and grossly normal strength/tone. b/l shoulders with no erythema or swelling. normal ROM of b/l shoulders;  sensation/reflex /pulses of all extremities wnl. 5/5 of strength of all extremities; negative empty can test negative, neer test positive on Lt and Hernandez test of b/l shoulder negative . negative spurling test b/l.slap test positive of Lt;  rom of Lt shoulder \par \par Skin:  no rash. \par Neurology:  normal gait, coordination grossly intact, no focal deficits and deep tendon reflexes were 2+ and symmetric. \par Psychiatric:  the affect was normal, oriented to person, place, and time and insight and judgment were intact.\par

## 2022-04-22 NOTE — ASSESSMENT
[FreeTextEntry1] : covid19 shot REFUSE; \par DESPITE EXPLIANT \par \par REFER TO GI\par \par  CXR REPEAT due to prior abnormal ct chest \par \par CHECK COVID19 AB; \par \par shouter pain: \par ? labral injury\par topical NSADIs;\par refer to phsycial tehrapy\par \par \par 1 week follow up for labs \par \par \par \par

## 2022-05-02 DIAGNOSIS — Z20.822 CONTACT WITH AND (SUSPECTED) EXPOSURE TO COVID-19: ICD-10-CM

## 2022-05-02 LAB
25(OH)D3 SERPL-MCNC: 38.4 NG/ML
ALBUMIN SERPL ELPH-MCNC: 4.4 G/DL
ALP BLD-CCNC: 88 U/L
ALT SERPL-CCNC: 19 U/L
ANION GAP SERPL CALC-SCNC: 11 MMOL/L
APPEARANCE: CLEAR
AST SERPL-CCNC: 22 U/L
BASOPHILS # BLD AUTO: 0.02 K/UL
BASOPHILS NFR BLD AUTO: 0.4 %
BILIRUB SERPL-MCNC: 0.4 MG/DL
BILIRUBIN URINE: NEGATIVE
BLOOD URINE: NEGATIVE
BUN SERPL-MCNC: 20 MG/DL
CALCIUM SERPL-MCNC: 9.3 MG/DL
CHLORIDE SERPL-SCNC: 108 MMOL/L
CHOLEST SERPL-MCNC: 194 MG/DL
CO2 SERPL-SCNC: 24 MMOL/L
COLOR: YELLOW
COVID-19 NUCLEOCAPSID  GAM ANTIBODY INTERPRETATION: POSITIVE
CREAT SERPL-MCNC: 0.99 MG/DL
EGFR: 87 ML/MIN/1.73M2
EOSINOPHIL # BLD AUTO: 0.15 K/UL
EOSINOPHIL NFR BLD AUTO: 2.8 %
ESTIMATED AVERAGE GLUCOSE: 103 MG/DL
GLUCOSE QUALITATIVE U: NEGATIVE
GLUCOSE SERPL-MCNC: 98 MG/DL
HBA1C MFR BLD HPLC: 5.2 %
HBV SURFACE AB SER QL: NONREACTIVE
HBV SURFACE AG SER QL: NONREACTIVE
HCT VFR BLD CALC: 45.5 %
HCV AB SER QL: NONREACTIVE
HCV S/CO RATIO: 0.34 S/CO
HDLC SERPL-MCNC: 40 MG/DL
HGB BLD-MCNC: 15.3 G/DL
IMM GRANULOCYTES NFR BLD AUTO: 0.2 %
KETONES URINE: NEGATIVE
LDLC SERPL CALC-MCNC: 132 MG/DL
LEUKOCYTE ESTERASE URINE: NEGATIVE
LYMPHOCYTES # BLD AUTO: 1.74 K/UL
LYMPHOCYTES NFR BLD AUTO: 32.1 %
MAN DIFF?: NORMAL
MCHC RBC-ENTMCNC: 33.6 GM/DL
MCHC RBC-ENTMCNC: 33.7 PG
MCV RBC AUTO: 100.2 FL
MONOCYTES # BLD AUTO: 0.51 K/UL
MONOCYTES NFR BLD AUTO: 9.4 %
NEUTROPHILS # BLD AUTO: 2.99 K/UL
NEUTROPHILS NFR BLD AUTO: 55.1 %
NITRITE URINE: NEGATIVE
NONHDLC SERPL-MCNC: 153 MG/DL
PH URINE: 6
PLATELET # BLD AUTO: 320 K/UL
POTASSIUM SERPL-SCNC: 4.2 MMOL/L
PROT SERPL-MCNC: 6.7 G/DL
PROTEIN URINE: NORMAL
PSA SERPL-MCNC: 0.52 NG/ML
RBC # BLD: 4.54 M/UL
RBC # FLD: 11.9 %
SARS-COV-2 AB SERPL QL IA: 213 INDEX
SODIUM SERPL-SCNC: 143 MMOL/L
SPECIFIC GRAVITY URINE: 1.03
TRIGL SERPL-MCNC: 107 MG/DL
TSH SERPL-ACNC: 3.87 UIU/ML
UROBILINOGEN URINE: NORMAL
WBC # FLD AUTO: 5.42 K/UL

## 2022-05-04 ENCOUNTER — APPOINTMENT (OUTPATIENT)
Dept: INTERNAL MEDICINE | Facility: CLINIC | Age: 62
End: 2022-05-04

## 2022-05-26 NOTE — PATIENT PROFILE ADULT - NSPROIMPLANTSMEDDEV_GEN_A_NUR
Benzoyl Peroxide Pregnancy And Lactation Text: This medication is Pregnancy Category C. It is unknown if benzoyl peroxide is excreted in breast milk. None

## 2022-07-08 LAB
HEMOCCULT STL QL IA: NEGATIVE
SARS-COV-2 N GENE NPH QL NAA+PROBE: NOT DETECTED

## 2023-01-09 ENCOUNTER — APPOINTMENT (OUTPATIENT)
Dept: ORTHOPEDIC SURGERY | Facility: CLINIC | Age: 63
End: 2023-01-09
Payer: MEDICAID

## 2023-01-09 VITALS
OXYGEN SATURATION: 97 % | SYSTOLIC BLOOD PRESSURE: 149 MMHG | HEIGHT: 70 IN | DIASTOLIC BLOOD PRESSURE: 82 MMHG | WEIGHT: 180 LBS | TEMPERATURE: 97.3 F | HEART RATE: 59 BPM | BODY MASS INDEX: 25.77 KG/M2

## 2023-01-09 DIAGNOSIS — M25.512 PAIN IN LEFT SHOULDER: ICD-10-CM

## 2023-01-09 DIAGNOSIS — G89.29 PAIN IN LEFT SHOULDER: ICD-10-CM

## 2023-01-09 PROCEDURE — 73030 X-RAY EXAM OF SHOULDER: CPT | Mod: LT

## 2023-01-09 PROCEDURE — 99203 OFFICE O/P NEW LOW 30 MIN: CPT

## 2023-01-09 NOTE — HISTORY OF PRESENT ILLNESS
[de-identified] : 62 year old RHD male  presents for initial evaluation of left shoulder pain x 1 year. He reports he injured his shoulder in [facility] last year, while arm was overhead. He reports seeing an orthopedist shortly after his injury who recommended Voltaren gel. He reports intermittent sharp pain worse with overhead activities, particularly with weights. He has not tried any medications for his pain. He denies paresthesias or neck pain. Denies prior injury.\par \par

## 2023-01-09 NOTE — DISCUSSION/SUMMARY
[de-identified] : The patient sustained an injury to his left shoulder.  He has evidence of glenohumeral osteoarthritis.  I have discussed the pathology, natural history and treatment options with him.  He will take over-the-counter medication and work on home stretching and strengthening exercises.  If symptoms worsen he will be reevaluated.  The patient is in agreement with the plan.

## 2023-01-09 NOTE — PHYSICAL EXAM
[Rad] : radial 2+ and symmetric bilaterally [Normal] : Alert and in no acute distress [Poor Appearance] : well-appearing [Acute Distress] : not in acute distress [Obese] : not obese [de-identified] : The patient has no respiratory distress. Mood and affect are normal. The patient is alert and oriented to person, place and time.\par Examination of the cervical spine demonstrates no tenderness, no deformity and no muscle spasm. Cervical spine rotation is 60° to the right, 60° to the left, 75° of extension and 45° of flexion. Neurologic exam of the upper extremities reveals intact sensation to light touch. Motor function is 5 over 5 in all groups. Deep tendon reflexes are 2+ and equal at the biceps, triceps and brachioradialis.\par Examination of the left shoulder demonstrates no deformity. The skin is intact. There is no erythema. There is tenderness anteriorly. Impingement sign is positive There is no instability. Drop arm test is negative. Empty can test is negative. Liftoff test is negative. DuPage test is negative.  He has elevation of 125 degrees, external rotation of 50 degrees and internal rotation to the mid lumbar level.  The elbows are stable.  There is no lymphedema. [de-identified] : AP, transscapular and axillary x-rays of the left shoulder taken today demonstrate no fracture or dislocation.  He has moderate degenerative changes at the glenohumeral joint.

## 2024-01-22 NOTE — ED ADULT NURSE NOTE - PRO INTERPRETER NEED 2
tyenol 1000mg every 6 hours as needed for pain next dose of tylenol at 5pm , oxycodone as needed for pain next dose 450 pm motrin can be taken at 6pm English